# Patient Record
Sex: MALE | Race: OTHER | HISPANIC OR LATINO
[De-identification: names, ages, dates, MRNs, and addresses within clinical notes are randomized per-mention and may not be internally consistent; named-entity substitution may affect disease eponyms.]

---

## 2018-12-06 ENCOUNTER — APPOINTMENT (OUTPATIENT)
Dept: RHEUMATOLOGY | Facility: CLINIC | Age: 43
End: 2018-12-06

## 2019-11-08 ENCOUNTER — APPOINTMENT (OUTPATIENT)
Dept: NEUROSURGERY | Facility: CLINIC | Age: 44
End: 2019-11-08
Payer: COMMERCIAL

## 2019-11-08 DIAGNOSIS — M47.22 OTHER SPONDYLOSIS WITH RADICULOPATHY, CERVICAL REGION: ICD-10-CM

## 2019-11-08 PROCEDURE — 99204 OFFICE O/P NEW MOD 45 MIN: CPT

## 2019-11-08 NOTE — ASSESSMENT
[FreeTextEntry1] : 44 year old gentleman with 3-4 months hx of neck pain with radiation to the right deltoid area. He also noticed paresthesia to the right arm and hand in the C6 distribution. Conservative Rx including PT, NSAIDs , and Gabapentin did not help him.\par \par On exam, he has pain-inhibited weakness of the right deltoid and biceps. Sensory function is diminished in the right thumb, index and middle finger. Gait is steady. ROM of the neck is limited with lateral rotation to either side, and with flexion extension due to increased neck pain. Novoa sign is negative.\par \par I reviewed an MRI of the cervicAL spine suggested right C4-5 spondylotic disc narrowing the neuro foramen and a narrowed disc space. \par \par I am referring the pt to see a pain specialist for consideration of injection therapy. I did also discuss with him about surgical option is he fails conservative Rx

## 2020-01-17 ENCOUNTER — APPOINTMENT (OUTPATIENT)
Dept: NEUROSURGERY | Facility: CLINIC | Age: 45
End: 2020-01-17